# Patient Record
Sex: MALE | Race: OTHER | HISPANIC OR LATINO | URBAN - METROPOLITAN AREA
[De-identification: names, ages, dates, MRNs, and addresses within clinical notes are randomized per-mention and may not be internally consistent; named-entity substitution may affect disease eponyms.]

---

## 2020-08-17 ENCOUNTER — INPATIENT (INPATIENT)
Facility: HOSPITAL | Age: 65
LOS: 1 days | Discharge: HOME | End: 2020-08-19
Attending: INTERNAL MEDICINE | Admitting: INTERNAL MEDICINE
Payer: MEDICAID

## 2020-08-17 VITALS
HEART RATE: 69 BPM | OXYGEN SATURATION: 100 % | DIASTOLIC BLOOD PRESSURE: 74 MMHG | RESPIRATION RATE: 14 BRPM | TEMPERATURE: 98 F | SYSTOLIC BLOOD PRESSURE: 142 MMHG

## 2020-08-17 DIAGNOSIS — K92.1 MELENA: ICD-10-CM

## 2020-08-17 PROBLEM — Z00.00 ENCOUNTER FOR PREVENTIVE HEALTH EXAMINATION: Status: ACTIVE | Noted: 2020-08-17

## 2020-08-17 LAB
ALBUMIN SERPL ELPH-MCNC: 4.4 G/DL — SIGNIFICANT CHANGE UP (ref 3.3–5)
ALP SERPL-CCNC: 83 U/L — SIGNIFICANT CHANGE UP (ref 40–120)
ALT FLD-CCNC: 41 U/L — SIGNIFICANT CHANGE UP (ref 4–41)
ANION GAP SERPL CALC-SCNC: 14 MMO/L — SIGNIFICANT CHANGE UP (ref 7–14)
APTT BLD: 34.4 SEC — SIGNIFICANT CHANGE UP (ref 27–36.3)
AST SERPL-CCNC: 26 U/L — SIGNIFICANT CHANGE UP (ref 4–40)
BASE EXCESS BLDV CALC-SCNC: 0.1 MMOL/L — SIGNIFICANT CHANGE UP
BASOPHILS # BLD AUTO: 0.03 K/UL — SIGNIFICANT CHANGE UP (ref 0–0.2)
BASOPHILS NFR BLD AUTO: 0.4 % — SIGNIFICANT CHANGE UP (ref 0–2)
BILIRUB SERPL-MCNC: 0.7 MG/DL — SIGNIFICANT CHANGE UP (ref 0.2–1.2)
BLD GP AB SCN SERPL QL: NEGATIVE — SIGNIFICANT CHANGE UP
BLOOD GAS VENOUS - CREATININE: 0.8 MG/DL — SIGNIFICANT CHANGE UP (ref 0.5–1.3)
BLOOD GAS VENOUS - FIO2: 21 — SIGNIFICANT CHANGE UP
BUN SERPL-MCNC: 15 MG/DL — SIGNIFICANT CHANGE UP (ref 7–23)
CALCIUM SERPL-MCNC: 9.1 MG/DL — SIGNIFICANT CHANGE UP (ref 8.4–10.5)
CHLORIDE BLDV-SCNC: 109 MMOL/L — HIGH (ref 96–108)
CHLORIDE SERPL-SCNC: 105 MMOL/L — SIGNIFICANT CHANGE UP (ref 98–107)
CO2 SERPL-SCNC: 23 MMOL/L — SIGNIFICANT CHANGE UP (ref 22–31)
CREAT SERPL-MCNC: 0.78 MG/DL — SIGNIFICANT CHANGE UP (ref 0.5–1.3)
EOSINOPHIL # BLD AUTO: 0.19 K/UL — SIGNIFICANT CHANGE UP (ref 0–0.5)
EOSINOPHIL NFR BLD AUTO: 2.7 % — SIGNIFICANT CHANGE UP (ref 0–6)
GAS PNL BLDV: 143 MMOL/L — SIGNIFICANT CHANGE UP (ref 136–146)
GLUCOSE BLDV-MCNC: 102 MG/DL — HIGH (ref 70–99)
GLUCOSE SERPL-MCNC: 102 MG/DL — HIGH (ref 70–99)
HCO3 BLDV-SCNC: 22 MMOL/L — SIGNIFICANT CHANGE UP (ref 20–27)
HCT VFR BLD CALC: 43.5 % — SIGNIFICANT CHANGE UP (ref 39–50)
HCT VFR BLDV CALC: 46.6 % — SIGNIFICANT CHANGE UP (ref 39–51)
HGB BLD-MCNC: 14.1 G/DL — SIGNIFICANT CHANGE UP (ref 13–17)
HGB BLDV-MCNC: 15.2 G/DL — SIGNIFICANT CHANGE UP (ref 13–17)
IMM GRANULOCYTES NFR BLD AUTO: 0.3 % — SIGNIFICANT CHANGE UP (ref 0–1.5)
INR BLD: 1.07 — SIGNIFICANT CHANGE UP (ref 0.88–1.16)
LACTATE BLDV-MCNC: 1.2 MMOL/L — SIGNIFICANT CHANGE UP (ref 0.5–2)
LYMPHOCYTES # BLD AUTO: 3.19 K/UL — SIGNIFICANT CHANGE UP (ref 1–3.3)
LYMPHOCYTES # BLD AUTO: 46 % — HIGH (ref 13–44)
MCHC RBC-ENTMCNC: 28.4 PG — SIGNIFICANT CHANGE UP (ref 27–34)
MCHC RBC-ENTMCNC: 32.4 % — SIGNIFICANT CHANGE UP (ref 32–36)
MCV RBC AUTO: 87.7 FL — SIGNIFICANT CHANGE UP (ref 80–100)
MONOCYTES # BLD AUTO: 0.57 K/UL — SIGNIFICANT CHANGE UP (ref 0–0.9)
MONOCYTES NFR BLD AUTO: 8.2 % — SIGNIFICANT CHANGE UP (ref 2–14)
NEUTROPHILS # BLD AUTO: 2.94 K/UL — SIGNIFICANT CHANGE UP (ref 1.8–7.4)
NEUTROPHILS NFR BLD AUTO: 42.4 % — LOW (ref 43–77)
NRBC # FLD: 0 K/UL — SIGNIFICANT CHANGE UP (ref 0–0)
OB PNL STL: NEGATIVE — SIGNIFICANT CHANGE UP
PCO2 BLDV: 51 MMHG — SIGNIFICANT CHANGE UP (ref 41–51)
PH BLDV: 7.32 PH — SIGNIFICANT CHANGE UP (ref 7.32–7.43)
PLATELET # BLD AUTO: 247 K/UL — SIGNIFICANT CHANGE UP (ref 150–400)
PMV BLD: 9.2 FL — SIGNIFICANT CHANGE UP (ref 7–13)
PO2 BLDV: 29 MMHG — LOW (ref 35–40)
POTASSIUM BLDV-SCNC: 3.7 MMOL/L — SIGNIFICANT CHANGE UP (ref 3.4–4.5)
POTASSIUM SERPL-MCNC: 3.9 MMOL/L — SIGNIFICANT CHANGE UP (ref 3.5–5.3)
POTASSIUM SERPL-SCNC: 3.9 MMOL/L — SIGNIFICANT CHANGE UP (ref 3.5–5.3)
PROT SERPL-MCNC: 7.9 G/DL — SIGNIFICANT CHANGE UP (ref 6–8.3)
PROTHROM AB SERPL-ACNC: 12.2 SEC — SIGNIFICANT CHANGE UP (ref 10.6–13.6)
RBC # BLD: 4.96 M/UL — SIGNIFICANT CHANGE UP (ref 4.2–5.8)
RBC # FLD: 13.1 % — SIGNIFICANT CHANGE UP (ref 10.3–14.5)
RH IG SCN BLD-IMP: POSITIVE — SIGNIFICANT CHANGE UP
SAO2 % BLDV: 45 % — LOW (ref 60–85)
SARS-COV-2 RNA SPEC QL NAA+PROBE: SIGNIFICANT CHANGE UP
SODIUM SERPL-SCNC: 142 MMOL/L — SIGNIFICANT CHANGE UP (ref 135–145)
WBC # BLD: 6.94 K/UL — SIGNIFICANT CHANGE UP (ref 3.8–10.5)
WBC # FLD AUTO: 6.94 K/UL — SIGNIFICANT CHANGE UP (ref 3.8–10.5)

## 2020-08-17 PROCEDURE — 99223 1ST HOSP IP/OBS HIGH 75: CPT | Mod: GC

## 2020-08-17 PROCEDURE — 99285 EMERGENCY DEPT VISIT HI MDM: CPT

## 2020-08-17 RX ORDER — PANTOPRAZOLE SODIUM 20 MG/1
80 TABLET, DELAYED RELEASE ORAL ONCE
Refills: 0 | Status: COMPLETED | OUTPATIENT
Start: 2020-08-17 | End: 2020-08-17

## 2020-08-17 RX ORDER — SOD SULF/SODIUM/NAHCO3/KCL/PEG
4000 SOLUTION, RECONSTITUTED, ORAL ORAL ONCE
Refills: 0 | Status: COMPLETED | OUTPATIENT
Start: 2020-08-17 | End: 2020-08-17

## 2020-08-17 RX ORDER — SOD SULF/SODIUM/NAHCO3/KCL/PEG
4000 SOLUTION, RECONSTITUTED, ORAL ORAL ONCE
Refills: 0 | Status: DISCONTINUED | OUTPATIENT
Start: 2020-08-17 | End: 2020-08-17

## 2020-08-17 RX ADMIN — PANTOPRAZOLE SODIUM 80 MILLIGRAM(S): 20 TABLET, DELAYED RELEASE ORAL at 13:53

## 2020-08-17 RX ADMIN — Medication 4000 MILLILITER(S): at 17:24

## 2020-08-17 NOTE — ED PROVIDER NOTE - NS ED ROS FT
Constitutional: No fever or Chills.  No unexpected weight loss.  Head, Eyes, Ears, Nose, Throat: No visual changes.  No tongue or throat swelling or pain.  Neck: No neck stiffness.  Pulmonary: No shortness of breath or cough.  No wheezing.  Cardiovascular: No chest pain, palpitations, or diaphoresis.  Abdominal: No abdominal pain. No nausea, vomiting, diarrhea.  + melenic stools, no bloody stools  Genitourinary: No dysuria or hematuria.  No discharge.  Endocrine: No frequent urination or unusual thirst.  No hair or skin changes.  Hematologic: No lymph node swelling, easy bruising, or bleeding.  Dermatologic: No rashes.  Allergic: No new exposures, mucosal swelling, or pruritis.  Neurologic: No headache, weakness, visual changes, speech changes, or sensory abnormalities.  No fainting episodes.  No gait imbalance.  Psychiatric: No depression or insomnia.

## 2020-08-17 NOTE — ED PROVIDER NOTE - PHYSICAL EXAMINATION
General: Seated in Stretcher, Awake, Alert, Conversant  Head, Ears, Eyes, Nose, Throat: Pupils equal, round, reactive to light, normal sclera, moist oral mucosa  Pulmonary: Breath sounds bilaterally, no increased work of breathing, speaking full sentences  Cardiovascular: Normal and regular heart rate, normal S1/S2, no murmurs, rubs, or gallops  Abdominal: Soft and nontender, no rebound or guarding  Genitourinary: Perry present as chaperone for exam.  Rectal exam demonstrate brown stool in the vault, no masses, nontender.  Extremities: No lower extremity edema or erythema, nontender  Dermatologic: No rash  Neurologic: Alert and oriented x3, clear and fluent speech, moving all extremities with good strength

## 2020-08-17 NOTE — H&P ADULT - ATTENDING COMMENTS
65 M without significant PMH presents with complaint of dark stool. Patient's outpatient GI discovered colonic mass and referred patient to [facility] to be seen by Dr. Puente. To undergo colonoscopy on 8/18. Otherwise hemodynamically stable with normal Hgb.    I examined this patient and discussed their management with house staff on 65 M without significant PMH presents with complaint of dark stool. Patient's outpatient GI discovered colonic mass and referred patient to [facility] to be seen by Dr. Puente. To undergo colonoscopy on 8/18. Otherwise hemodynamically stable with normal Hgb.    I examined this patient and discussed their management with house staff on 8/17/2020.

## 2020-08-17 NOTE — CONSULT NOTE ADULT - SUBJECTIVE AND OBJECTIVE BOX
Chief Complaint:  Patient is a 65y old  Male who presents with a chief complaint of     HPI:    Allergies:  No Known Allergies      Home Medications:    Hospital Medications:      PMHX/PSHX:  No pertinent past medical history  No significant past surgical history      Family history:      Social History:     ROS:     General:  No wt loss, fevers, chills, night sweats, fatigue,   Eyes:  Good vision, no reported pain  ENT:  No sore throat, pain, runny nose, dysphagia  CV:  No pain, palpitations, hypo/hypertension  Resp:  No dyspnea, cough, tachypnea, wheezing  GI:  See HPI  :  No pain, bleeding, incontinence, nocturia  Muscle:  No pain, weakness  Neuro:  No weakness, tingling, memory problems  Psych:  No fatigue, insomnia, mood problems, depression  Endocrine:  No polyuria, polydipsia, cold/heat intolerance  Heme:  No petechiae, ecchymosis, easy bruisability  Skin:  No rash, edema      PHYSICAL EXAM:     GENERAL:  NAD  CHEST:  Full & symmetric excursion  HEART:  Regular rhythm, no abdominal bruit, no edema  ABDOMEN:  Soft, non-tender, non-distended, normoactive bowel sounds,  no masses , no hepatosplenomegaly  EXTREMITIES:  no cyanosis,clubbing or edema  SKIN:  No rash/erythema/ecchymoses/petechiae/wounds/abscess/warm/dry  NEURO:  Alert, oriented    Vital Signs:  Vital Signs Last 24 Hrs  T(C): 36.8 (17 Aug 2020 10:36), Max: 36.8 (17 Aug 2020 10:36)  T(F): 98.2 (17 Aug 2020 10:36), Max: 98.2 (17 Aug 2020 10:36)  HR: 69 (17 Aug 2020 10:36) (69 - 69)  BP: 142/74 (17 Aug 2020 10:36) (142/74 - 142/74)  BP(mean): --  RR: 14 (17 Aug 2020 10:36) (14 - 14)  SpO2: 100% (17 Aug 2020 10:36) (100% - 100%)  Daily     Daily     LABS:                        14.1   6.94  )-----------( 247      ( 17 Aug 2020 11:32 )             43.5     08-17    142  |  105  |  15  ----------------------------<  102<H>  3.9   |  23  |  0.78    Ca    9.1      17 Aug 2020 11:32    TPro  7.9  /  Alb  4.4  /  TBili  0.7  /  DBili  x   /  AST  26  /  ALT  41  /  AlkPhos  83  08-17    LIVER FUNCTIONS - ( 17 Aug 2020 11:32 )  Alb: 4.4 g/dL / Pro: 7.9 g/dL / ALK PHOS: 83 u/L / ALT: 41 u/L / AST: 26 u/L / GGT: x           PT/INR - ( 17 Aug 2020 11:32 )   PT: 12.2 SEC;   INR: 1.07          PTT - ( 17 Aug 2020 11:32 )  PTT:34.4 SEC        Imaging: Chief Complaint:  Patient is a 65y old  Male who presents with a chief complaint of     HPI:  The patient is a 65M Denies PMHx who p/w two episodes of dark stools over the past two days.  The patient states yesterday he noted a very dark BM x1.  He then had a second this morning and he called his GI who called  and patient told to present to ED.    Denies daily ETOH use.  No aspirin or anticoagulant use.  Patient denies any GI complaints and does not think he has had an EGD of colonoscopy outpatient.    in ED VSS hemoglobin normal, BUN normal, occult negative.    Allergies:  No Known Allergies      Home Medications:    Hospital Medications:      PMHX/PSHX:  No pertinent past medical history  No significant past surgical history      Family history:      Social History:     ROS:     General:  No wt loss, fevers, chills, night sweats, fatigue,   Eyes:  Good vision, no reported pain  ENT:  No sore throat, pain, runny nose, dysphagia  CV:  No pain, palpitations, hypo/hypertension  Resp:  No dyspnea, cough, tachypnea, wheezing  GI:  See HPI  :  No pain, bleeding, incontinence, nocturia  Muscle:  No pain, weakness  Neuro:  No weakness, tingling, memory problems  Psych:  No fatigue, insomnia, mood problems, depression  Endocrine:  No polyuria, polydipsia, cold/heat intolerance  Heme:  No petechiae, ecchymosis, easy bruisability  Skin:  No rash, edema      PHYSICAL EXAM:     GENERAL:  NAD  CHEST:  Full & symmetric excursion  HEART:  Regular rhythm, no abdominal bruit, no edema  ABDOMEN:  Soft, non-tender, non-distended, normoactive bowel sounds,  no masses , no hepatosplenomegaly  EXTREMITIES:  no cyanosis,clubbing or edema  SKIN:  No rash/erythema/ecchymoses/petechiae/wounds/abscess/warm/dry  NEURO:  Alert, oriented  Rectal: light brown stool    Vital Signs:  Vital Signs Last 24 Hrs  T(C): 36.8 (17 Aug 2020 10:36), Max: 36.8 (17 Aug 2020 10:36)  T(F): 98.2 (17 Aug 2020 10:36), Max: 98.2 (17 Aug 2020 10:36)  HR: 69 (17 Aug 2020 10:36) (69 - 69)  BP: 142/74 (17 Aug 2020 10:36) (142/74 - 142/74)  BP(mean): --  RR: 14 (17 Aug 2020 10:36) (14 - 14)  SpO2: 100% (17 Aug 2020 10:36) (100% - 100%)  Daily     Daily     LABS:                        14.1   6.94  )-----------( 247      ( 17 Aug 2020 11:32 )             43.5     08-17    142  |  105  |  15  ----------------------------<  102<H>  3.9   |  23  |  0.78    Ca    9.1      17 Aug 2020 11:32    TPro  7.9  /  Alb  4.4  /  TBili  0.7  /  DBili  x   /  AST  26  /  ALT  41  /  AlkPhos  83  08-17    LIVER FUNCTIONS - ( 17 Aug 2020 11:32 )  Alb: 4.4 g/dL / Pro: 7.9 g/dL / ALK PHOS: 83 u/L / ALT: 41 u/L / AST: 26 u/L / GGT: x           PT/INR - ( 17 Aug 2020 11:32 )   PT: 12.2 SEC;   INR: 1.07          PTT - ( 17 Aug 2020 11:32 )  PTT:34.4 SEC        Imaging: Chief Complaint:  Patient is a 65y old  Male who presents with a chief complaint of     HPI:  The patient is a 65M Denies PMHx who p/w two episodes of dark stools over the past two days.  The patient states yesterday he noted a very dark BM x1.  He then had a second this morning and he called his GI who called  and patient told to present to ED.    Denies daily ETOH use.  No aspirin or anticoagulant use.  Patient denies any GI complaints and does not think he has had an EGD of colonoscopy outpatient.  However per discussion with outpatient GI via Dr. Puente the patient did have a colonoscopy with a high risk lesion on the left side of colon.    in ED VSS hemoglobin normal, BUN normal, occult negative.    Allergies:  No Known Allergies      Home Medications:    Hospital Medications:      PMHX/PSHX:  No pertinent past medical history  No significant past surgical history      Family history:      Social History:     ROS:     General:  No wt loss, fevers, chills, night sweats, fatigue,   Eyes:  Good vision, no reported pain  ENT:  No sore throat, pain, runny nose, dysphagia  CV:  No pain, palpitations, hypo/hypertension  Resp:  No dyspnea, cough, tachypnea, wheezing  GI:  See HPI  :  No pain, bleeding, incontinence, nocturia  Muscle:  No pain, weakness  Neuro:  No weakness, tingling, memory problems  Psych:  No fatigue, insomnia, mood problems, depression  Endocrine:  No polyuria, polydipsia, cold/heat intolerance  Heme:  No petechiae, ecchymosis, easy bruisability  Skin:  No rash, edema      PHYSICAL EXAM:     GENERAL:  NAD  CHEST:  Full & symmetric excursion  HEART:  Regular rhythm, no abdominal bruit, no edema  ABDOMEN:  Soft, non-tender, non-distended, normoactive bowel sounds,  no masses , no hepatosplenomegaly  EXTREMITIES:  no cyanosis,clubbing or edema  SKIN:  No rash/erythema/ecchymoses/petechiae/wounds/abscess/warm/dry  NEURO:  Alert, oriented  Rectal: light brown stool    Vital Signs:  Vital Signs Last 24 Hrs  T(C): 36.8 (17 Aug 2020 10:36), Max: 36.8 (17 Aug 2020 10:36)  T(F): 98.2 (17 Aug 2020 10:36), Max: 98.2 (17 Aug 2020 10:36)  HR: 69 (17 Aug 2020 10:36) (69 - 69)  BP: 142/74 (17 Aug 2020 10:36) (142/74 - 142/74)  BP(mean): --  RR: 14 (17 Aug 2020 10:36) (14 - 14)  SpO2: 100% (17 Aug 2020 10:36) (100% - 100%)  Daily     Daily     LABS:                        14.1   6.94  )-----------( 247      ( 17 Aug 2020 11:32 )             43.5     08-17    142  |  105  |  15  ----------------------------<  102<H>  3.9   |  23  |  0.78    Ca    9.1      17 Aug 2020 11:32    TPro  7.9  /  Alb  4.4  /  TBili  0.7  /  DBili  x   /  AST  26  /  ALT  41  /  AlkPhos  83  08-17    LIVER FUNCTIONS - ( 17 Aug 2020 11:32 )  Alb: 4.4 g/dL / Pro: 7.9 g/dL / ALK PHOS: 83 u/L / ALT: 41 u/L / AST: 26 u/L / GGT: x           PT/INR - ( 17 Aug 2020 11:32 )   PT: 12.2 SEC;   INR: 1.07          PTT - ( 17 Aug 2020 11:32 )  PTT:34.4 SEC        Imaging:

## 2020-08-17 NOTE — H&P ADULT - NSHPPHYSICALEXAM_GEN_ALL_CORE
PHYSICAL EXAM:  GENERAL: NAD, sitting in bed comfortably  HEAD:  Atraumatic, Normocephalic  EYES: EOMI, PERRLA, conjunctiva and sclera clear  ENT: Moist mucous membranes  NECK: Supple, No JVD  CHEST/LUNG: Clear to auscultation bilaterally; No rales, rhonchi, wheezing, or rubs. Unlabored respirations  HEART: Regular rate and rhythm; No murmurs, rubs, or gallops  ABDOMEN: Bowel sounds present; Soft, Nontender, Nondistended. No hepatomegally  EXTREMITIES:  2+ Peripheral Pulses, brisk capillary refill. No clubbing, cyanosis, or edema  NERVOUS SYSTEM:  Alert & Oriented X3, speech clear. No deficits   MSK: FROM all 4 extremities, full and equal strength  SKIN: No rashes or lesions

## 2020-08-17 NOTE — H&P ADULT - PROBLEM SELECTOR PLAN 2
-March 2020 COVID-19 infection, hospitalized for five days for cough, fever SOB  Not intubated  Resolved now

## 2020-08-17 NOTE — H&P ADULT - HISTORY OF PRESENT ILLNESS
65 y.o M with no significant PMH who presents to ED for two episodes of dark stool, his first dark BM was yesterday and second was this AM (patient states stool was dark). Patient called Dr. Baltazar his GI doctor, who told him to go to ED. Patient denied ETOH, ASA and anti-coagulation use. Patient also denies nausea, vomiting, abdominal pain. Endorses some dizziness but also states has not eaten since last night. 65 y.o Frisian-speaking M with PMH of COVID-19 pneumonia now resolved who presents to ED for two episodes of dark stool, his first dark BM was yesterday and second was this AM (patient states stool was dark). He states this has never happened before. Patient called Dr. Baltazar his GI doctor, who told him to go to ED. Of note, per Dr. Esposito patient has had colonoscopy which showed lesion on L side colon. Patient denied recent ETOH, ASA and anti-coagulation use. Patient also denies nausea, vomiting, abdominal pain. Endorses some dizziness and poor appetite.   in ED patient's vitals were WNL, CBC, CMP, PTT/INR all WNL. GI was consulted who contacted GI doctor Dr. Esposito and plan for colonoscopy tomorrow.   Patient claims he has no significant PMH besides COVID-19 in March 2020, which required 5 days of hospitalization due to cough, fever, SOB, no intubation required.   Patient is on no medications at this time.

## 2020-08-17 NOTE — CONSULT NOTE ADULT - ASSESSMENT
Impression:  1) Dark stools - with normal hemoglobin, normal BUN, occult negative and normal rectal x 2.  Unlikely patient is having a clinically significant GI bleed at this time.    Recommendations:   - age appropriate screening outpatient   - outpatient f/u with patient GI   - no further inpatient GI work up needed    Liberty Saucedo  Gastroenterology Fellow  Pager x [phone] or [phone]  (From 8 am - 5 pm or on weekends and holidays please page GI on call at [phone]) Impression:  1) Dark stools - with normal hemoglobin, normal BUN, occult negative and normal rectal x 2.  Unlikely patient is having a clinically significant GI bleed at this time.  2) High risk colonic lesion - seen on recent colonoscopy    Recommendations:   - no need for inpatient EGD   - colonoscopy for TA   - Patient to have colonoscopy tomorrow   - Put patient on clear diet today    - patient to be NPO after midnight   - patient will need to take prep starting at 6:00 pm    - ensure patient has completed entire prep and is having clear bowel movements, call GI fellow in morning if not the case   - please obtain 1:00 am labs so procedure is not delayed      Liberty Saucedo  Gastroenterology Fellow  Pager x [phone] or [phone]  (From 8 am - 5 pm or on weekends and holidays please page GI on call at [phone])

## 2020-08-17 NOTE — ED PROVIDER NOTE - PROGRESS NOTE DETAILS
Dr. Lopez: case D/W Pt'sa gastroenterologist and [facility], GI consulted.  Plan is for admission to medicine.  Advise that pt has a high-grade colonic polyp which will additionally require endoscopic evaluation.  Patient given PPI given report of melenic stool.  Endorsed to hospitalist and accepted.

## 2020-08-17 NOTE — H&P ADULT - NSHPLABSRESULTS_GEN_ALL_CORE
13.4   4.58  )-----------( 247      ( 18 Aug 2020 03:00 )             41.4     Auto Eosinophil # x     / Auto Eosinophil % x     / Auto Neutrophil # x     / Auto Neutrophil % x     / BANDS % x                            14.1   6.94  )-----------( 247      ( 17 Aug 2020 11:32 )             43.5     Auto Eosinophil # 0.19  / Auto Eosinophil % 2.7   / Auto Neutrophil # 2.94  / Auto Neutrophil % 42.4  / BANDS % x        08-18    143  |  108<H>  |  11  ----------------------------<  75  3.7   |  22  |  0.66  08-17    142  |  105  |  15  ----------------------------<  102<H>  3.9   |  23  |  0.78    Ca    8.5      18 Aug 2020 03:00  Mg     2.3     08-18  Phos  2.8     08-18  TPro  7.9  /  Alb  4.4  /  TBili  0.7  /  DBili  x   /  AST  26  /  ALT  41  /  AlkPhos  83  08-17    PT/INR - ( 18 Aug 2020 03:00 )   PT: 13.0 SEC;   INR: 1.15          PTT - ( 18 Aug 2020 03:00 )  PTT:33.9 SEC

## 2020-08-17 NOTE — ED ADULT TRIAGE NOTE - CHIEF COMPLAINT QUOTE
Pt sent by Dr. Puente for GI bleed. Pt states he has had dark black stools since this morning. Pmhx of cholecystectomy. Denies abd pain at this time.

## 2020-08-17 NOTE — H&P ADULT - PROBLEM SELECTOR PLAN 1
2 episodes of melena stool  History of L colonic mass found in colonoscopy at [facility]    Plan:   - colonscopy today  - GI following 2 episodes of melena stool  History of L colonic mass found in colonoscopy at [facility]    Plan:   - coloonscopy tomorrow  - GI following

## 2020-08-17 NOTE — H&P ADULT - NSHPSOCIALHISTORY_GEN_ALL_CORE
Remote smoking history as teenager, 1-2 cigarettes daily over 3-4 years.   Does not currently drink alcohol, drank a little as a teenager  Denies recreational drug use.   Lives with wife and children in [place]  Works in [employer], currently not working because school is closed

## 2020-08-17 NOTE — ED PROVIDER NOTE - CLINICAL SUMMARY MEDICAL DECISION MAKING FREE TEXT BOX
65M Denies PMHx Presenting with dark stool.  Will evaluate for GI bleed.  No evidence of infection at this time.  Evaluate for anemia/hemorrhage.  Plan for labs, contact with pt's Gastroenterologist, re-eval.

## 2020-08-17 NOTE — ED PROVIDER NOTE - OBJECTIVE STATEMENT
65M Denies PMHx P/W 10 hours of profuse dark stool, multiple episodes, last episode prior to arrival.  No clear provoking or palliating factors.  Denies abdominal pain.  No nausea or vomiting.  No prior history of GI bleed.  No fever or chills.  Denies daily ETOH use.  No aspirin or anticoagulant use.  Presents sent in by gastroenterology

## 2020-08-17 NOTE — H&P ADULT - ASSESSMENT
65 y.o M with PMH of COVID-19 infection presents with 2 episodes of melena stool, currently stable, admitted for colonoscopy to rule out colonic mass.

## 2020-08-17 NOTE — H&P ADULT - NSHPREVIEWOFSYSTEMS_GEN_ALL_CORE
CONSTITUTIONAL:  No weight loss, fever, chills, weakness or fatigue.  HEENT:  Eyes:  No visual loss, blurred vision, double vision or yellow sclerae.   Ears, Nose, Throat:  No hearing loss, sneezing, congestion, runny nose or sore throat.  SKIN:  No rash or itching.  CARDIOVASCULAR:  No chest pain, chest pressure or chest discomfort. No palpitations.  RESPIRATORY:  No shortness of breath, cough or sputum.  GASTROINTESTINAL:  (+) melena (+) No anorexia No nausea, vomiting or diarrhea. No abdominal pain   GENITOURINARY:  (+) endorse dribbling Denies hematuria, dysuria.   NEUROLOGICAL:  (+) dizziness  No headache, syncope  MUSCULOSKELETAL:  (+) LE pain b/l at rest, relieved with walking  HEMATOLOGIC:  No anemia, bleeding or bruising.  LYMPHATICS:  No enlarged nodes.   PSYCHIATRIC:  No history of depression or anxiety.  ENDOCRINOLOGIC:  No reports of sweating, cold or heat intolerance. No polyuria or polydipsia.  ALLERGIES:  No history of asthma, hives, eczema or rhinitis.

## 2020-08-17 NOTE — ED ADULT NURSE NOTE - OBJECTIVE STATEMENT
pt received at intake rm 9 AAO x 3. pt reports bright red blood in stool and dark stools since midnight. pt denies blood thinner use, sob, chest pain, n/v/d, fevers, chills, abdominal pain. respirations even and unlabored. 20g iv placed to left ac.

## 2020-08-18 ENCOUNTER — RESULT REVIEW (OUTPATIENT)
Age: 65
End: 2020-08-18

## 2020-08-18 DIAGNOSIS — U07.1 COVID-19: ICD-10-CM

## 2020-08-18 DIAGNOSIS — K92.1 MELENA: ICD-10-CM

## 2020-08-18 DIAGNOSIS — Z29.9 ENCOUNTER FOR PROPHYLACTIC MEASURES, UNSPECIFIED: ICD-10-CM

## 2020-08-18 LAB
ANION GAP SERPL CALC-SCNC: 13 MMO/L — SIGNIFICANT CHANGE UP (ref 7–14)
APTT BLD: 33.9 SEC — SIGNIFICANT CHANGE UP (ref 27–36.3)
BLD GP AB SCN SERPL QL: NEGATIVE — SIGNIFICANT CHANGE UP
BUN SERPL-MCNC: 11 MG/DL — SIGNIFICANT CHANGE UP (ref 7–23)
CALCIUM SERPL-MCNC: 8.5 MG/DL — SIGNIFICANT CHANGE UP (ref 8.4–10.5)
CHLORIDE SERPL-SCNC: 108 MMOL/L — HIGH (ref 98–107)
CO2 SERPL-SCNC: 22 MMOL/L — SIGNIFICANT CHANGE UP (ref 22–31)
CREAT SERPL-MCNC: 0.66 MG/DL — SIGNIFICANT CHANGE UP (ref 0.5–1.3)
GLUCOSE SERPL-MCNC: 75 MG/DL — SIGNIFICANT CHANGE UP (ref 70–99)
HCT VFR BLD CALC: 41.4 % — SIGNIFICANT CHANGE UP (ref 39–50)
HCV AB S/CO SERPL IA: 0.12 S/CO — SIGNIFICANT CHANGE UP (ref 0–0.99)
HCV AB SERPL-IMP: SIGNIFICANT CHANGE UP
HGB BLD-MCNC: 13.4 G/DL — SIGNIFICANT CHANGE UP (ref 13–17)
INR BLD: 1.15 — SIGNIFICANT CHANGE UP (ref 0.88–1.16)
MAGNESIUM SERPL-MCNC: 2.3 MG/DL — SIGNIFICANT CHANGE UP (ref 1.6–2.6)
MCHC RBC-ENTMCNC: 28.4 PG — SIGNIFICANT CHANGE UP (ref 27–34)
MCHC RBC-ENTMCNC: 32.4 % — SIGNIFICANT CHANGE UP (ref 32–36)
MCV RBC AUTO: 87.7 FL — SIGNIFICANT CHANGE UP (ref 80–100)
NRBC # FLD: 0 K/UL — SIGNIFICANT CHANGE UP (ref 0–0)
PHOSPHATE SERPL-MCNC: 2.8 MG/DL — SIGNIFICANT CHANGE UP (ref 2.5–4.5)
PLATELET # BLD AUTO: 247 K/UL — SIGNIFICANT CHANGE UP (ref 150–400)
PMV BLD: 9.3 FL — SIGNIFICANT CHANGE UP (ref 7–13)
POTASSIUM SERPL-MCNC: 3.7 MMOL/L — SIGNIFICANT CHANGE UP (ref 3.5–5.3)
POTASSIUM SERPL-SCNC: 3.7 MMOL/L — SIGNIFICANT CHANGE UP (ref 3.5–5.3)
PROTHROM AB SERPL-ACNC: 13 SEC — SIGNIFICANT CHANGE UP (ref 10.6–13.6)
RBC # BLD: 4.72 M/UL — SIGNIFICANT CHANGE UP (ref 4.2–5.8)
RBC # FLD: 12.9 % — SIGNIFICANT CHANGE UP (ref 10.3–14.5)
RH IG SCN BLD-IMP: POSITIVE — SIGNIFICANT CHANGE UP
SODIUM SERPL-SCNC: 143 MMOL/L — SIGNIFICANT CHANGE UP (ref 135–145)
WBC # BLD: 4.58 K/UL — SIGNIFICANT CHANGE UP (ref 3.8–10.5)
WBC # FLD AUTO: 4.58 K/UL — SIGNIFICANT CHANGE UP (ref 3.8–10.5)

## 2020-08-18 PROCEDURE — 99233 SBSQ HOSP IP/OBS HIGH 50: CPT | Mod: GC

## 2020-08-18 PROCEDURE — 45388 COLONOSCOPY W/ABLATION: CPT | Mod: GC,59

## 2020-08-18 PROCEDURE — 45390 COLONOSCOPY W/RESECTION: CPT | Mod: GC

## 2020-08-18 PROCEDURE — 88305 TISSUE EXAM BY PATHOLOGIST: CPT | Mod: 26

## 2020-08-18 NOTE — PROGRESS NOTE ADULT - SUBJECTIVE AND OBJECTIVE BOX
Tamika Crawford MD  PGY 1 Department of Internal Medicine  Pager: [phone] ([facility])       Patient is a 65y old  Male who presents with a chief complaint of Dark stools x2 (17 Aug 2020 16:34)      SUBJECTIVE / OVERNIGHT EVENTS: Pt seen and examined. No acute overnight events. Denies fevers, chills, CP, SOB, Abdominal pain, N/V, Constipation, Diarrhea. Any other bowel movements___________________        MEDICATIONS  (STANDING):    MEDICATIONS  (PRN):      I&O's Summary      Vital Signs Last 24 Hrs  T(C): 36.5 (18 Aug 2020 04:05), Max: 36.8 (17 Aug 2020 10:36)  T(F): 97.7 (18 Aug 2020 04:05), Max: 98.3 (17 Aug 2020 21:45)  HR: 69 (18 Aug 2020 04:05) (62 - 69)  BP: 110/60 (18 Aug 2020 04:05) (110/60 - 150/86)  BP(mean): --  RR: 17 (18 Aug 2020 04:05) (14 - 20)  SpO2: 100% (18 Aug 2020 04:05) (97% - 100%)    CAPILLARY BLOOD GLUCOSE          PHYSICAL EXAM:         LABS:                        13.4   4.58  )-----------( 247      ( 18 Aug 2020 03:00 )             41.4     Auto Eosinophil # x     / Auto Eosinophil % x     / Auto Neutrophil # x     / Auto Neutrophil % x     / BANDS % x                            14.1   6.94  )-----------( 247      ( 17 Aug 2020 11:32 )             43.5     Auto Eosinophil # 0.19  / Auto Eosinophil % 2.7   / Auto Neutrophil # 2.94  / Auto Neutrophil % 42.4  / BANDS % x        08-18    143  |  108<H>  |  11  ----------------------------<  75  3.7   |  22  |  0.66  08-17    142  |  105  |  15  ----------------------------<  102<H>  3.9   |  23  |  0.78    Ca    8.5      18 Aug 2020 03:00  Mg     2.3     08-18  Phos  2.8     08-18  TPro  7.9  /  Alb  4.4  /  TBili  0.7  /  DBili  x   /  AST  26  /  ALT  41  /  AlkPhos  83  08-17    PT/INR - ( 18 Aug 2020 03:00 )   PT: 13.0 SEC;   INR: 1.15          PTT - ( 18 Aug 2020 03:00 )  PTT:33.9 SEC              RADIOLOGY & ADDITIONAL TESTS:    Imaging Personally Reviewed:    Consultant(s) Notes Reviewed:      Care Discussed with Consultants/Other Providers: Tamika Crawford MD  PGY 1 Department of Internal Medicine  Pager: [phone] ([facility])       Patient is a 65y old  Male who presents with a chief complaint of Dark stools x2 (17 Aug 2020 16:34)      SUBJECTIVE / OVERNIGHT EVENTS: Pt seen and examined. No acute overnight events. Denies fevers, chills, CP, SOB, Abdominal pain, N/V, Constipation, Diarrhea. Colonoscopy planned for today.       MEDICATIONS  (STANDING):    MEDICATIONS  (PRN):      I&O's Summary      Vital Signs Last 24 Hrs  T(C): 36.5 (18 Aug 2020 04:05), Max: 36.8 (17 Aug 2020 10:36)  T(F): 97.7 (18 Aug 2020 04:05), Max: 98.3 (17 Aug 2020 21:45)  HR: 69 (18 Aug 2020 04:05) (62 - 69)  BP: 110/60 (18 Aug 2020 04:05) (110/60 - 150/86)  BP(mean): --  RR: 17 (18 Aug 2020 04:05) (14 - 20)  SpO2: 100% (18 Aug 2020 04:05) (97% - 100%)    CAPILLARY BLOOD GLUCOSE          PHYSICAL EXAM:         LABS:                        13.4   4.58  )-----------( 247      ( 18 Aug 2020 03:00 )             41.4     Auto Eosinophil # x     / Auto Eosinophil % x     / Auto Neutrophil # x     / Auto Neutrophil % x     / BANDS % x                            14.1   6.94  )-----------( 247      ( 17 Aug 2020 11:32 )             43.5     Auto Eosinophil # 0.19  / Auto Eosinophil % 2.7   / Auto Neutrophil # 2.94  / Auto Neutrophil % 42.4  / BANDS % x        08-18    143  |  108<H>  |  11  ----------------------------<  75  3.7   |  22  |  0.66  08-17    142  |  105  |  15  ----------------------------<  102<H>  3.9   |  23  |  0.78    Ca    8.5      18 Aug 2020 03:00  Mg     2.3     08-18  Phos  2.8     08-18  TPro  7.9  /  Alb  4.4  /  TBili  0.7  /  DBili  x   /  AST  26  /  ALT  41  /  AlkPhos  83  08-17    PT/INR - ( 18 Aug 2020 03:00 )   PT: 13.0 SEC;   INR: 1.15          PTT - ( 18 Aug 2020 03:00 )  PTT:33.9 SEC

## 2020-08-18 NOTE — PROGRESS NOTE ADULT - PROBLEM SELECTOR PLAN 1
2 episodes of melena stool  History of L colonic mass found in colonoscopy at [facility]    Plan:   - colonscopy today  - GI following

## 2020-08-19 ENCOUNTER — TRANSCRIPTION ENCOUNTER (OUTPATIENT)
Age: 65
End: 2020-08-19

## 2020-08-19 VITALS
TEMPERATURE: 98 F | OXYGEN SATURATION: 97 % | SYSTOLIC BLOOD PRESSURE: 116 MMHG | DIASTOLIC BLOOD PRESSURE: 67 MMHG | RESPIRATION RATE: 17 BRPM | HEART RATE: 67 BPM

## 2020-08-19 LAB
ANION GAP SERPL CALC-SCNC: 12 MMO/L — SIGNIFICANT CHANGE UP (ref 7–14)
BUN SERPL-MCNC: 10 MG/DL — SIGNIFICANT CHANGE UP (ref 7–23)
CALCIUM SERPL-MCNC: 8.7 MG/DL — SIGNIFICANT CHANGE UP (ref 8.4–10.5)
CHLORIDE SERPL-SCNC: 107 MMOL/L — SIGNIFICANT CHANGE UP (ref 98–107)
CO2 SERPL-SCNC: 22 MMOL/L — SIGNIFICANT CHANGE UP (ref 22–31)
CREAT SERPL-MCNC: 0.68 MG/DL — SIGNIFICANT CHANGE UP (ref 0.5–1.3)
GLUCOSE SERPL-MCNC: 92 MG/DL — SIGNIFICANT CHANGE UP (ref 70–99)
HCT VFR BLD CALC: 41.3 % — SIGNIFICANT CHANGE UP (ref 39–50)
HGB BLD-MCNC: 13.6 G/DL — SIGNIFICANT CHANGE UP (ref 13–17)
MAGNESIUM SERPL-MCNC: 2.3 MG/DL — SIGNIFICANT CHANGE UP (ref 1.6–2.6)
MCHC RBC-ENTMCNC: 28.8 PG — SIGNIFICANT CHANGE UP (ref 27–34)
MCHC RBC-ENTMCNC: 32.9 % — SIGNIFICANT CHANGE UP (ref 32–36)
MCV RBC AUTO: 87.5 FL — SIGNIFICANT CHANGE UP (ref 80–100)
NRBC # FLD: 0 K/UL — SIGNIFICANT CHANGE UP (ref 0–0)
PHOSPHATE SERPL-MCNC: 3.8 MG/DL — SIGNIFICANT CHANGE UP (ref 2.5–4.5)
PLATELET # BLD AUTO: 240 K/UL — SIGNIFICANT CHANGE UP (ref 150–400)
PMV BLD: 9.6 FL — SIGNIFICANT CHANGE UP (ref 7–13)
POTASSIUM SERPL-MCNC: 4.1 MMOL/L — SIGNIFICANT CHANGE UP (ref 3.5–5.3)
POTASSIUM SERPL-SCNC: 4.1 MMOL/L — SIGNIFICANT CHANGE UP (ref 3.5–5.3)
RBC # BLD: 4.72 M/UL — SIGNIFICANT CHANGE UP (ref 4.2–5.8)
RBC # FLD: 13 % — SIGNIFICANT CHANGE UP (ref 10.3–14.5)
SODIUM SERPL-SCNC: 141 MMOL/L — SIGNIFICANT CHANGE UP (ref 135–145)
WBC # BLD: 6.14 K/UL — SIGNIFICANT CHANGE UP (ref 3.8–10.5)
WBC # FLD AUTO: 6.14 K/UL — SIGNIFICANT CHANGE UP (ref 3.8–10.5)

## 2020-08-19 PROCEDURE — 99239 HOSP IP/OBS DSCHRG MGMT >30: CPT | Mod: GC

## 2020-08-19 PROCEDURE — 99232 SBSQ HOSP IP/OBS MODERATE 35: CPT | Mod: GC

## 2020-08-19 RX ORDER — ENOXAPARIN SODIUM 100 MG/ML
40 INJECTION SUBCUTANEOUS DAILY
Refills: 0 | Status: DISCONTINUED | OUTPATIENT
Start: 2020-08-19 | End: 2020-08-19

## 2020-08-19 RX ADMIN — ENOXAPARIN SODIUM 40 MILLIGRAM(S): 100 INJECTION SUBCUTANEOUS at 11:36

## 2020-08-19 NOTE — DISCHARGE NOTE PROVIDER - PROVIDER TOKENS
FREE:[LAST:[[facility]],FIRST:[[facility]],PHONE:[phone]],FAX:[(   [phone]    -],ADDRESS:[[address] [place] [address]],SCHEDULEDAPPT:[08/26/2020],SCHEDULEDAPPTTIME:[12:00 AM]]

## 2020-08-19 NOTE — PROGRESS NOTE ADULT - SUBJECTIVE AND OBJECTIVE BOX
Tamika Crawford MD  PGY 1 Department of Internal Medicine  Pager: [phone] ([facility])       Patient is a 65y old  Male who presents with a chief complaint of Dark stools x2 (18 Aug 2020 06:34)      SUBJECTIVE / OVERNIGHT EVENTS: Pt seen and examined. No acute overnight events, patient had colonoscopy yesterday. Denies fevers, chills, CP, SOB, Abdominal pain, N/V, Constipation, Diarrhea        MEDICATIONS  (STANDING):  enoxaparin Injectable 40 milliGRAM(s) SubCutaneous daily    MEDICATIONS  (PRN):      I&O's Summary      Vital Signs Last 24 Hrs  T(C): 36.6 (19 Aug 2020 05:23), Max: 36.7 (18 Aug 2020 09:06)  T(F): 97.8 (19 Aug 2020 05:23), Max: 98 (18 Aug 2020 09:06)  HR: 66 (19 Aug 2020 05:23) (66 - 89)  BP: 106/63 (19 Aug 2020 05:23) (101/46 - 147/67)  BP(mean): 91 (18 Aug 2020 19:15) (68 - 91)  RR: 17 (19 Aug 2020 05:23) (17 - 20)  SpO2: 98% (19 Aug 2020 05:23) (97% - 100%)    CAPILLARY BLOOD GLUCOSE          PHYSICAL EXAM:  GENERAL: NAD, sitting in bed comfortably  	HEAD:  Atraumatic, Normocephalic  	EYES: EOMI, PERRLA, conjunctiva and sclera clear  	ENT: Moist mucous membranes  	NECK: Supple, No JVD  	CHEST/LUNG: Clear to auscultation bilaterally; No rales, rhonchi, wheezing, or rubs. Unlabored respirations  	HEART: Regular rate and rhythm; No murmurs, rubs, or gallops  	ABDOMEN: Bowel sounds present; Soft, Nontender, Nondistended. No hepatomegally  	EXTREMITIES:  2+ Peripheral Pulses, brisk capillary refill. No clubbing, cyanosis, or edema  	NERVOUS SYSTEM:  Alert & Oriented X3, speech clear. No deficits   	MSK: FROM all 4 extremities, full and equal strength  SKIN: No rashes or lesions    LABS:                        13.4   4.58  )-----------( 247      ( 18 Aug 2020 03:00 )             41.4     Auto Eosinophil # x     / Auto Eosinophil % x     / Auto Neutrophil # x     / Auto Neutrophil % x     / BANDS % x                            14.1   6.94  )-----------( 247      ( 17 Aug 2020 11:32 )             43.5     Auto Eosinophil # 0.19  / Auto Eosinophil % 2.7   / Auto Neutrophil # 2.94  / Auto Neutrophil % 42.4  / BANDS % x        08-18    143  |  108<H>  |  11  ----------------------------<  75  3.7   |  22  |  0.66  08-17    142  |  105  |  15  ----------------------------<  102<H>  3.9   |  23  |  0.78    Ca    8.5      18 Aug 2020 03:00  Mg     2.3     08-18  Phos  2.8     08-18  TPro  7.9  /  Alb  4.4  /  TBili  0.7  /  DBili  x   /  AST  26  /  ALT  41  /  AlkPhos  83  08-17    PT/INR - ( 18 Aug 2020 03:00 )   PT: 13.0 SEC;   INR: 1.15          PTT - ( 18 Aug 2020 03:00 )  PTT:33.9 SEC Tamika Crawford MD  PGY 1 Department of Internal Medicine  Pager: [phone] ([facility])   [facility]: [id number]      Patient is a 65y old  Male who presents with a chief complaint of Dark stools x2 (18 Aug 2020 06:34)      SUBJECTIVE / OVERNIGHT EVENTS: Pt seen and examined. No acute overnight events, patient had colonoscopy yesterday. Denies fevers, chills, CP, SOB, Abdominal pain, N/V, Constipation, Diarrhea        MEDICATIONS  (STANDING):  enoxaparin Injectable 40 milliGRAM(s) SubCutaneous daily    MEDICATIONS  (PRN):      I&O's Summary      Vital Signs Last 24 Hrs  T(C): 36.6 (19 Aug 2020 05:23), Max: 36.7 (18 Aug 2020 09:06)  T(F): 97.8 (19 Aug 2020 05:23), Max: 98 (18 Aug 2020 09:06)  HR: 66 (19 Aug 2020 05:23) (66 - 89)  BP: 106/63 (19 Aug 2020 05:23) (101/46 - 147/67)  BP(mean): 91 (18 Aug 2020 19:15) (68 - 91)  RR: 17 (19 Aug 2020 05:23) (17 - 20)  SpO2: 98% (19 Aug 2020 05:23) (97% - 100%)    CAPILLARY BLOOD GLUCOSE          PHYSICAL EXAM:  GENERAL: NAD, sitting in bed comfortably  	HEAD:  Atraumatic, Normocephalic  	EYES: EOMI, PERRLA, conjunctiva and sclera clear  	ENT: Moist mucous membranes  	NECK: Supple, No JVD  	CHEST/LUNG: Clear to auscultation bilaterally; No rales, rhonchi, wheezing, or rubs. Unlabored respirations  	HEART: Regular rate and rhythm; No murmurs, rubs, or gallops  	ABDOMEN: Bowel sounds present; Soft, Nontender, Nondistended. No hepatomegally  	EXTREMITIES:  2+ Peripheral Pulses, brisk capillary refill. No clubbing, cyanosis, or edema  	NERVOUS SYSTEM:  Alert & Oriented X3, speech clear. No deficits   	MSK: FROM all 4 extremities, full and equal strength  SKIN: No rashes or lesions    LABS:                        13.4   4.58  )-----------( 247      ( 18 Aug 2020 03:00 )             41.4     Auto Eosinophil # x     / Auto Eosinophil % x     / Auto Neutrophil # x     / Auto Neutrophil % x     / BANDS % x                            14.1   6.94  )-----------( 247      ( 17 Aug 2020 11:32 )             43.5     Auto Eosinophil # 0.19  / Auto Eosinophil % 2.7   / Auto Neutrophil # 2.94  / Auto Neutrophil % 42.4  / BANDS % x        08-18    143  |  108<H>  |  11  ----------------------------<  75  3.7   |  22  |  0.66  08-17    142  |  105  |  15  ----------------------------<  102<H>  3.9   |  23  |  0.78    Ca    8.5      18 Aug 2020 03:00  Mg     2.3     08-18  Phos  2.8     08-18  TPro  7.9  /  Alb  4.4  /  TBili  0.7  /  DBili  x   /  AST  26  /  ALT  41  /  AlkPhos  83  08-17    PT/INR - ( 18 Aug 2020 03:00 )   PT: 13.0 SEC;   INR: 1.15          PTT - ( 18 Aug 2020 03:00 )  PTT:33.9 SEC

## 2020-08-19 NOTE — PROGRESS NOTE ADULT - PROBLEM SELECTOR PLAN 1
2 episodes of melena stool  History of L colonic mass found in colonoscopy at [facility]    Plan:   - colonoscopy yesterday  - GI following 2 episodes of melena stool  History of L colonic mass found in colonoscopy at [facility]  Colonoscopy yesterday, 30 mm sigmoid polyp removed    Plan:   - colonoscopy yesterday, 30 mm sigmoid polyp removed  - GI cleared for discharged  - Contacted patient's outpatient GI. Dr. Hurtado, signed out patient to him.   - Patient scheduled for outpatient follow-up: 8/26/20 11:30 am at [place] with Dr. Hurtado  - Discharge today

## 2020-08-19 NOTE — PROGRESS NOTE ADULT - SUBJECTIVE AND OBJECTIVE BOX
Chief Complaint:  Patient is a 65y old  Male who presents with a chief complaint of Dark stools x2 (19 Aug 2020 07:00)    Interval Events:   No acute overnight events  No abdominal pain, nausea, vomiting, diarrhea, hematochezia    Allergies:  No Known Allergies    Hospital Medications:  enoxaparin Injectable 40 milliGRAM(s) SubCutaneous daily    PMHX/PSHX:  No pertinent past medical history  No significant past surgical history    ROS:   General:  No fevers, chills or night sweats  ENT:  No sore throat or dysphagia  CV:  No pain or palpitations  Resp:  No dyspnea, cough or  wheezing  GI:  No pain, No nausea, No vomiting, No diarrhea, No rectal bleeding, No tarry stools,  Skin:  No rash or edema    PHYSICAL EXAM:   Vital Signs:  Vital Signs Last 24 Hrs  T(C): 36.7 (19 Aug 2020 09:05), Max: 36.7 (19 Aug 2020 09:05)  T(F): 98 (19 Aug 2020 09:05), Max: 98 (19 Aug 2020 09:05)  HR: 75 (19 Aug 2020 09:05) (66 - 79)  BP: 111/61 (19 Aug 2020 09:05) (101/46 - 139/71)  BP(mean): 91 (18 Aug 2020 19:15) (68 - 91)  RR: 16 (19 Aug 2020 09:05) (16 - 20)  SpO2: 100% (19 Aug 2020 09:05) (97% - 100%)  Daily     Daily     GENERAL:  NAD, Appears stated age  HEENT:  NC/AT,  conjunctivae clear and pink, sclera -anicteric  CHEST:  Normal Effort, Breath sounds clear  HEART:  RRR, S1 + S2, no murmurs  ABDOMEN:  Soft, non-tender, non-distended, BS+  EXTEREMITIES:  no cyanosis  SKIN:  Warm & Dry.  NEURO:  Alert, oriented    LABS:                        13.6   6.14  )-----------( 240      ( 19 Aug 2020 06:30 )             41.3     Mean Cell Volume: 87.5 fL (08-19-20 @ 06:30)    08-19    141  |  107  |  10  ----------------------------<  92  4.1   |  22  |  0.68    Ca    8.7      19 Aug 2020 06:30  Phos  3.8     08-19  Mg     2.3     08-19    TPro  7.9  /  Alb  4.4  /  TBili  0.7  /  DBili  x   /  AST  26  /  ALT  41  /  AlkPhos  83  08-17    LIVER FUNCTIONS - ( 17 Aug 2020 11:32 )  Alb: 4.4 g/dL / Pro: 7.9 g/dL / ALK PHOS: 83 u/L / ALT: 41 u/L / AST: 26 u/L / GGT: x           PT/INR - ( 18 Aug 2020 03:00 )   PT: 13.0 SEC;   INR: 1.15          PTT - ( 18 Aug 2020 03:00 )  PTT:33.9 SEC                            13.6   6.14  )-----------( 240      ( 19 Aug 2020 06:30 )             41.3                         13.4   4.58  )-----------( 247      ( 18 Aug 2020 03:00 )             41.4                         14.1   6.94  )-----------( 247      ( 17 Aug 2020 11:32 )             43.5       Imaging:

## 2020-08-19 NOTE — DISCHARGE NOTE PROVIDER - CARE PROVIDER_API CALL
Carlos Hurtado  [address] [place], [place] [address] [address]  [place] [address]  Phone: [phone]  Fax: (   )    -  Scheduled Appointment: 08/26/2020 12:00 AM

## 2020-08-19 NOTE — DISCHARGE NOTE PROVIDER - HOSPITAL COURSE
65 y.o Portuguese-speaking M with PMH of COVID-19 pneumonia now resolved presented to ED on August 17 2020 for two episodes of melena. His GI doctor, Dr. Hurtado advised him to come to [facility] to get colonoscopy completed by Dr. Esposito. In the ED, patient's vitals and labs were all WNL with a Hgb of 14. GI was consulted, and patient received colonoscopy August 18 and was found to have a                 called Dr. Baltazar his GI doctor, who told him to go to ED. Of note, per Dr. Esposito patient has had colonoscopy which showed lesion on L side colon. Patient denied recent ETOH, ASA and anti-coagulation use. Patient also denies nausea, vomiting, abdominal pain. Endorses some dizziness and poor appetite.     in ED patient's vitals were WNL, CBC, CMP, PTT/INR all WNL. GI was consulted who contacted GI doctor Dr. Esposito and plan for colonoscopy tomorrow.     Patient claims he has no significant PMH besides COVID-19 in March 2020, which required 5 days of hospitalization due to cough, fever, SOB, no intubation required.     Patient is on no medications at this time. 65 y.o Sami-speaking M with PMH of COVID-19 pneumonia now resolved presented to ED on August 17 2020 for two episodes of melena. His GI doctor, Dr. Hurtado advised him to come to [facility] to get colonoscopy to be completed by Dr. Esposito. In the ED, patient's vitals and labs were all WNL with a Hgb of 14 and a BUN of 15 . GI was consulted, and patient received colonoscopy August 18 and was found to have a 30 mm polyp in sigmoid colon which was resected. Patient tolerated procedure well, and tolerated breakfast today. He has had no further episodes of melena during this hospitalization, vitals and labs all WNL. Patient's outpatient GI doctor, Dr. Hurtado was contacted and informed about patient's colonoscopy results. Patient scheduled an appointment to see S[facility] next Wednesday, August 26th 2020. 65 M with PMH of COVID-19 pneumonia now resolved presented to ED on August 17 2020 for two episodes of melena. His GI doctor, Dr. Hurtado advised him to come to [facility] to get colonoscopy to be completed by Dr. Esposito. In the ED, patient's vitals and labs were all WNL with a Hgb of 14 and a BUN of 15 . GI was consulted, and patient received colonoscopy August 18 and was found to have a 30 mm polyp in sigmoid colon which was resected. Patient tolerated procedure well, and tolerated breakfast on day of discharge. He has had no further episodes of melena during this hospitalization, vitals and labs all WNL. Patient's outpatient GI doctor, Dr. Hurtado was contacted and informed about patient's colonoscopy results. Patient scheduled an appointment to see Dr. Hurtado next Wednesday, August 26th 2020.

## 2020-08-19 NOTE — PROGRESS NOTE ADULT - ASSESSMENT
Anesthesia Post Operative Note    s/p day 1 of procedure:    65y Male POSTOP DAY 1 S/P colonscopy    Vital Signs Last 24 Hrs  T(C): 36.8 (19 Aug 2020 13:23), Max: 36.8 (19 Aug 2020 13:23)  T(F): 98.2 (19 Aug 2020 13:23), Max: 98.2 (19 Aug 2020 13:23)  HR: 67 (19 Aug 2020 13:23) (66 - 79)  BP: 116/67 (19 Aug 2020 13:23) (101/46 - 139/71)  BP(mean): 91 (18 Aug 2020 19:15) (68 - 91)  RR: 17 (19 Aug 2020 13:23) (16 - 20)  SpO2: 97% (19 Aug 2020 13:23) (97% - 100%)    Patient seen at 3:19 pm    Doing well, no anesthetic complications or complaints noted or reported.  Pain is controlled.
65 y.o M with PMH of COVID-19 infection presents with 2 episodes of melena stool, currently stable, admitted for colonoscopy to rule out colonic mass.
Impression:  # High risk sigmoid colon lesion s/p EMR with removal  # Dark stools - with normal hemoglobin, normal BUN, occult negative and normal rectal x 2.  No evidence of dark stools on colonsocopy    Recommendations:  - Diet as tolerated  - Follow up path  - No GI contraindication to discharge if tolerating PO  - Supportive care per primary team    Isaura Bae MD  Gastroenterology Fellow  [phone]  Available on Microsoft Teams  Please page on call fellow on weekends and after 5pm on weekdays: [phone]
65 y.o M with PMH of COVID-19 infection presents with 2 episodes of melena stool, currently stable, admitted for colonoscopy to rule out colonic mass.

## 2020-08-19 NOTE — PROGRESS NOTE ADULT - PROBLEM SELECTOR PLAN 2
-March 2020 COVID-19 infection, hospitalized for five days for cough, fever SOB  Not intubated  Resolved now
-March 2020 COVID-19 infection, hospitalized for five days for cough, fever SOB  Not intubated  Resolved now

## 2020-08-19 NOTE — DISCHARGE NOTE NURSING/CASE MANAGEMENT/SOCIAL WORK - PATIENT PORTAL LINK FT
You can access the FollowMyHealth Patient Portal offered by [facility] by registering at the following website: [email or web address]. By joining [employer]s FollowMyHealth portal, you will also be able to view your health information using other applications (apps) compatible with our system.

## 2020-08-19 NOTE — PROGRESS NOTE ADULT - ATTENDING COMMENTS
65 M without significant PMH presents with complaint of dark stool. Patient's outpatient GI discovered colonic mass and referred patient to [facility] to be seen by Dr. Puente. To undergo colonoscopy today. Hgb stable.
65 M without significant PMH presents with complaint of dark stool. Patient's outpatient GI discovered colonic mass and referred patient to [facility] to be seen by Dr. Puente. s/p colonoscopy with sigmoid polypectomy. No further dark BMs or blood per rectum. Hgb stable. Patient to be d/c with outpatient GI f/u.    I spent 35 minutes counseling this patient and coordinating their discharge.

## 2020-08-19 NOTE — DISCHARGE NOTE PROVIDER - NSDCCPCAREPLAN_GEN_ALL_CORE_FT
PRINCIPAL DISCHARGE DIAGNOSIS  Diagnosis: Colon adenoma  Assessment and Plan of Treatment: You had a colonoscopy yesterday which showed you have a polyp in your colon (bowels). It was removed. You need to follow up with your GI doctor, Dr. Hurtado for further management. As of now you are stable and have no episodes of bleeding or dark stool. If you have any epsidoes of dark color stool, blood in the rectum, nausea, vomiting, lightheadedness, dizziness or abdominal pain please call your GI doctor and go to emergency room.      SECONDARY DISCHARGE DIAGNOSES  Diagnosis: Melena  Assessment and Plan of Treatment: You had dark stools from your rectum. This could be caused by your polyp which was found on colonoscopy. You have not had any more episodes of dark stool since you have been in the hospital. Currently your blood pressures and hgb levels are all normal. It is important that you follow up with you GI doctor Dr. Hurtado next Wednesday. If you have anymore episodes of dark stool or red blood from rectum, call your GI doctor. If you exerience nausea, vomiting, dizziness, weakness with dark stools, please go to emergency room.

## 2023-07-09 NOTE — PROGRESS NOTE ADULT - PROBLEM SELECTOR PLAN 3
Diet: DASH diet     DVT ppx: Lovenox
Diet: NPO for procedure    DVT ppx: SCDs
09-Jul-2023
10-Jul-2023

## 2024-11-12 ENCOUNTER — APPOINTMENT (OUTPATIENT)
Dept: MRI IMAGING | Facility: CLINIC | Age: 69
End: 2024-11-12
Payer: MEDICARE

## 2024-11-12 PROCEDURE — A9585: CPT | Mod: JW

## 2024-11-12 PROCEDURE — 70553 MRI BRAIN STEM W/O & W/DYE: CPT
